# Patient Record
Sex: FEMALE | URBAN - METROPOLITAN AREA
[De-identification: names, ages, dates, MRNs, and addresses within clinical notes are randomized per-mention and may not be internally consistent; named-entity substitution may affect disease eponyms.]

---

## 2021-04-09 ENCOUNTER — IMMUNIZATION (OUTPATIENT)
Dept: PRIMARY CARE CLINIC | Age: 46
End: 2021-04-09
Payer: COMMERCIAL

## 2021-04-09 PROCEDURE — 91300 COVID-19, PFIZER VACCINE 30MCG/0.3ML DOSE: CPT | Performed by: INTERNAL MEDICINE

## 2021-04-09 PROCEDURE — 0001A COVID-19, PFIZER VACCINE 30MCG/0.3ML DOSE: CPT | Performed by: INTERNAL MEDICINE

## 2021-04-30 ENCOUNTER — IMMUNIZATION (OUTPATIENT)
Dept: PRIMARY CARE CLINIC | Age: 46
End: 2021-04-30
Payer: COMMERCIAL

## 2021-04-30 PROCEDURE — 91300 COVID-19, PFIZER VACCINE 30MCG/0.3ML DOSE: CPT | Performed by: INTERNAL MEDICINE

## 2021-04-30 PROCEDURE — 0002A COVID-19, PFIZER VACCINE 30MCG/0.3ML DOSE: CPT | Performed by: INTERNAL MEDICINE

## 2023-02-17 ENCOUNTER — OFFICE VISIT (OUTPATIENT)
Dept: GASTROENTEROLOGY | Age: 48
End: 2023-02-17
Payer: COMMERCIAL

## 2023-02-17 ENCOUNTER — HOSPITAL ENCOUNTER (OUTPATIENT)
Age: 48
Discharge: HOME | End: 2023-02-17
Payer: COMMERCIAL

## 2023-02-17 VITALS
HEIGHT: 66 IN | BODY MASS INDEX: 22.98 KG/M2 | SYSTOLIC BLOOD PRESSURE: 130 MMHG | DIASTOLIC BLOOD PRESSURE: 68 MMHG | WEIGHT: 143 LBS

## 2023-02-17 DIAGNOSIS — R10.13 DYSPEPSIA: Primary | ICD-10-CM

## 2023-02-17 DIAGNOSIS — R10.13 DYSPEPSIA: ICD-10-CM

## 2023-02-17 LAB
CRP SERPL HS-MCNC: <3 MG/L (ref 0–5)
ERYTHROCYTE [SEDIMENTATION RATE] IN BLOOD BY WESTERGREN METHOD: 6 MM/HR (ref 0–20)
FOLATE SERPL-MCNC: >20 NG/ML
GLIADIN IGA SER IA-ACNC: NORMAL U/ML
GLIADIN IGG SER IA-ACNC: NORMAL U/ML
IGA SERPL-MCNC: 244 MG/DL (ref 70–400)
TTG IGA SER IA-ACNC: NORMAL U/ML
VIT B12 SERPL-MCNC: >2000 PG/ML (ref 232–1245)

## 2023-02-17 PROCEDURE — 82746 ASSAY OF FOLIC ACID SERUM: CPT

## 2023-02-17 PROCEDURE — 82607 VITAMIN B-12: CPT

## 2023-02-17 PROCEDURE — 99203 OFFICE O/P NEW LOW 30 MIN: CPT | Performed by: INTERNAL MEDICINE

## 2023-02-17 PROCEDURE — 82784 ASSAY IGA/IGD/IGG/IGM EACH: CPT

## 2023-02-17 PROCEDURE — 83516 IMMUNOASSAY NONANTIBODY: CPT

## 2023-02-17 PROCEDURE — 85652 RBC SED RATE AUTOMATED: CPT

## 2023-02-17 PROCEDURE — 36415 COLL VENOUS BLD VENIPUNCTURE: CPT

## 2023-02-17 PROCEDURE — 86140 C-REACTIVE PROTEIN: CPT

## 2023-02-17 RX ORDER — UBIDECARENONE 75 MG
50 CAPSULE ORAL DAILY
COMMUNITY

## 2023-02-17 RX ORDER — FLUTICASONE PROPIONATE 50 MCG
SPRAY, SUSPENSION (ML) NASAL
COMMUNITY
Start: 2022-11-27

## 2023-02-17 RX ORDER — ALPRAZOLAM 0.25 MG/1
TABLET ORAL
COMMUNITY
Start: 2021-07-30

## 2023-02-17 RX ORDER — MULTIVIT-MIN/IRON/FOLIC ACID/K 18-600-40
CAPSULE ORAL
COMMUNITY

## 2023-02-17 RX ORDER — OMEPRAZOLE 40 MG/1
40 CAPSULE, DELAYED RELEASE ORAL DAILY
COMMUNITY
Start: 2022-11-27

## 2023-02-17 RX ORDER — OMEPRAZOLE 20 MG/1
20 CAPSULE, DELAYED RELEASE ORAL
Qty: 180 CAPSULE | Refills: 1 | Status: SHIPPED | OUTPATIENT
Start: 2023-02-17

## 2023-02-17 RX ORDER — SUCRALFATE 1 G/1
1 TABLET ORAL 4 TIMES DAILY
COMMUNITY
Start: 2023-01-08

## 2023-02-17 ASSESSMENT — ENCOUNTER SYMPTOMS
SORE THROAT: 0
DIARRHEA: 1
BLOOD IN STOOL: 0
ABDOMINAL PAIN: 1
ANAL BLEEDING: 0
NAUSEA: 1
TROUBLE SWALLOWING: 0
ABDOMINAL DISTENTION: 1
RECTAL PAIN: 1
COUGH: 0
CHOKING: 0
VOMITING: 0
SHORTNESS OF BREATH: 0
EYES NEGATIVE: 1
CONSTIPATION: 0
CHEST TIGHTNESS: 0

## 2023-02-17 NOTE — PROGRESS NOTES
Reason for Referral: Dyspepsia, changes in appetite, IBS-like foods. Early satiety. No referring provider defined for this encounter. Chief Complaint   Patient presents with    Irritable Bowel Syndrome           HISTORY OF PRESENT ILLNESS: Elliott Patterson is a 57 y.o. female with a past history remarkable for early satiety, irregular bowel habits and dyspepsia with abdominal pain localized to the epigastric region, subacute progression of the patient's symptoms since early December and months prior, referred for evaluation of the symptoms. Since the patient has been infected with COVID, the patient has been experiencing early morning nausea with dizziness and fatigue. He does report of postprandial satiety and reduction in the patient's appetite. Additionally, the patient was identified to have a family history significant for duodenal cancer, diagnosed at the age of 76. Patient had a recent colonoscopy in 2021 where the patient was identified to have a polyp, upper endoscopy performed at the same time which was negative. She presents now with persistent symptoms despite being on Carafate which has provided significant relief in her symptoms. Does not report any blood in her stools, no irregular bowel habits. Patient had performed food comprehensive panel outside which resulted in possible sensitivities to many food ingredients including wheat and almonds    Smoker: None   Drinking history: none   Illicit drugs:  None   Abdominal surgeries: None   Prior Colonoscopy: 2021-- Polyp,   Prior EGD: 2021--none   FH of GI issues: Mother- Duodenal Ca-- 76      Past Medical,Family, and Social History reviewed and does contribute to the patient presentingcondition. Patient's PMH/PSH,SH,PSYCH Hx, MEDs, ALLERGIES, and ROS were all reviewed and updated in the appropriate sections. PAST MEDICAL HISTORY:  No past medical history on file. No past surgical history on file.     CURRENT MEDICATIONS:    Current Outpatient Medications:     omeprazole (PRILOSEC) 20 MG delayed release capsule, Take 1 capsule by mouth 2 times daily (before meals), Disp: 180 capsule, Rfl: 1    sucralfate (CARAFATE) 1 GM tablet, 1 g 4 times daily (Patient not taking: Reported on 2/17/2023), Disp: , Rfl:     omeprazole (PRILOSEC) 40 MG delayed release capsule, 40 mg Daily (Patient not taking: Reported on 2/17/2023), Disp: , Rfl:     ALPRAZolam (XANAX) 0.25 MG tablet, , Disp: , Rfl:     fluticasone (FLONASE) 50 MCG/ACT nasal spray, , Disp: , Rfl:     Cholecalciferol (VITAMIN D) 50 MCG (2000 UT) CAPS capsule, Take by mouth, Disp: , Rfl:     vitamin B-12 (CYANOCOBALAMIN) 100 MCG tablet, Take 50 mcg by mouth daily, Disp: , Rfl:     ALLERGIES:   Allergies   Allergen Reactions    Cephalexin Diarrhea    Sertraline Anxiety, Dizziness or Vertigo and Palpitations       FAMILY HISTORY: No family history on file. SOCIAL HISTORY:   Social History     Socioeconomic History    Marital status:      Spouse name: Not on file    Number of children: Not on file    Years of education: Not on file    Highest education level: Not on file   Occupational History    Not on file   Tobacco Use    Smoking status: Not on file    Smokeless tobacco: Not on file   Substance and Sexual Activity    Alcohol use: Not on file    Drug use: Not on file    Sexual activity: Not on file   Other Topics Concern    Not on file   Social History Narrative    Not on file     Social Determinants of Health     Financial Resource Strain: Not on file   Food Insecurity: Not on file   Transportation Needs: Not on file   Physical Activity: Not on file   Stress: Not on file   Social Connections: Not on file   Intimate Partner Violence: Not on file   Housing Stability: Not on file         REVIEW OF SYSTEMS: A 12-point review of systems was obtained and pertinent positives and negatives were listed below.      REVIEW OF SYSTEMS:     Constitutional: No fever, no chills, no lethargy, no weakness. HEENT:  No headache, otalgia, itchy eyes, nasal discharge or sore throat. Cardiac:  No chest pain, dyspnea, orthopnea or PND. Chest:   No cough, phlegm or wheezing. Abdomen:      Detailed by MA   Neuro:  No focal weakness, abnormal movements or seizure like activity. Skin:   No rashes, no itching. :   No hematuria, no pyuria, no dysuria, no flank pain. Extremities:  No swelling or joint pains. ROS was otherwise negative    Review of Systems   Constitutional:  Positive for appetite change, fatigue and unexpected weight change. HENT:  Negative for sore throat and trouble swallowing. Eyes: Negative. Respiratory:  Negative for cough, choking, chest tightness and shortness of breath. Cardiovascular:  Negative for chest pain. Gastrointestinal:  Positive for abdominal distention, abdominal pain, diarrhea, nausea and rectal pain. Negative for anal bleeding, blood in stool, constipation and vomiting. Endocrine: Negative. Allergic/Immunologic: Positive for food allergies. PHYSICAL EXAMINATION: Vital signs reviewed per the nursing documentation. /68 (Site: Left Upper Arm, Position: Sitting, Cuff Size: Small Adult)   Ht 5' 6\" (1.676 m)   Wt 143 lb (64.9 kg)   BMI 23.08 kg/m²   Body mass index is 23.08 kg/m². Physical Exam    Physical Exam   Constitutional: Patient is oriented to person, place, and time. Patient appears well-developed and well-nourished. HENT:   Head: Normocephalic and atraumatic. Eyes: Pupils are equal, round, and reactive to light. EOM are normal.   Neck: Normal range of motion. Neck supple. No JVD present. No tracheal deviation present. No thyromegaly present. Cardiovascular: Normal rate, regular rhythm, normal heart sounds and intact distal pulses. Pulmonary/Chest: Effort normal and breath sounds normal. No stridor. No respiratory distress. He has no wheezes. He has no rales. He exhibits no tenderness. Abdominal: Soft.  Bowel sounds are normal. He exhibits no distension and no mass. There is no tenderness. There is no rebound and no guarding. No hernia. Musculoskeletal: Normal range of motion. Lymphadenopathy:    Patient has no cervical adenopathy. Neurological: Patient is alert and oriented to person, place, and time. Psychiatric: Patient has a normal mood and affect. Patient behavior is normal.       LABORATORY DATA: Reviewed  No results found for: WBC, HGB, HCT, MCV, PLT, NA, K, CL, CO2, BUN, CREATININE, LABPROT, LABALBU, GGT, BILITOT, ALKPHOS, AST, ALT, INR      No results found for: RBC, HGB, MCV, MCH, MCHC, RDW, MPV, BASOPCT, LYMPHSABS, MONOSABS, NEUTROABS, EOSABS, BASOSABS      DIAGNOSTIC TESTING:     No results found. IMPRESSION: Torey Coker is a 57 y.o. female with a past history remarkable for early satiety, irregular bowel habits and dyspepsia with abdominal pain localized to the epigastric region, subacute progression of the patient's symptoms since early December and months prior, referred for evaluation of the symptoms. Since the patient has been infected with COVID, the patient has been experiencing early morning nausea with dizziness and fatigue. He does report of postprandial satiety and reduction in the patient's appetite. Additionally, the patient was identified to have a family history significant for duodenal cancer, diagnosed at the age of 76. Patient had a recent colonoscopy in 2021 where the patient was identified to have a polyp, upper endoscopy performed at the same time which was negative. She presents now with persistent symptoms despite being on Carafate which has provided significant relief in her symptoms. Does not report any blood in her stools, no irregular bowel habits. Patient had performed food comprehensive panel outside which resulted in possible sensitivities to many food ingredients including wheat and almonds    Assessment  1.  Dyspepsia        Retia Guard was seen today for irritable bowel syndrome. Diagnoses and all orders for this visit:    Dyspepsia-early satiety with epigastric discomfort, I will send for serologic markers to evaluate for luminal causes. We will place patient on Prilosec 20 g twice daily. Patient advised to maintain a dietary log. Continue with Carafate as currently ordered. We will need to consider the possibility of IBS however other etiologies would need to be excluded. May need to consider upper endoscopy in the future. Monitor response to initial therapy. SIBO considered part of the differential.  -     Celiac Disease Panel; Future  -     Calprotectin Stool; Future  -     Sedimentation rate, automated; Future  -     C-Reactive Protein; Future  -     Vitamin B12 & Folate; Future    Other orders  -     omeprazole (PRILOSEC) 20 MG delayed release capsule; Take 1 capsule by mouth 2 times daily (before meals)         RTC: 3 months. Additional comments: Thank you for allowing me to participate in the care of Ms. Ojeda. For any further questions please do not hesitate to contact me. I have reviewed and agree with the MA/LPN ROS please refer to their documentation from today's encounter on a separate note. Humza Licona MD, MPH   Board Certified in Gastroenterology  Board Certified in [address] [place] [address] #: [phone]          this note is created with the assistance of a speech recognition program.  While intending to generate a document that actually reflects the content of the visit, the document can still have some errors including those of syntax and sound a like substitutions which may escape proof reading. It such instances, actual meaning can be extrapolated by contextual diversion.

## 2023-02-22 ENCOUNTER — HOSPITAL ENCOUNTER (OUTPATIENT)
Facility: CLINIC | Age: 48
Setting detail: SPECIMEN
Discharge: HOME | End: 2023-02-22
Payer: COMMERCIAL

## 2023-02-22 DIAGNOSIS — R10.13 DYSPEPSIA: ICD-10-CM

## 2023-02-22 PROCEDURE — 83993 ASSAY FOR CALPROTECTIN FECAL: CPT

## 2023-02-24 LAB — CALPROTECTIN, FECAL: 106 UG/G

## 2023-03-03 ENCOUNTER — TELEPHONE (OUTPATIENT)
Dept: GASTROENTEROLOGY | Age: 48
End: 2023-03-03

## 2023-03-07 DIAGNOSIS — R19.7 DIARRHEA, UNSPECIFIED TYPE: Primary | ICD-10-CM

## 2023-03-22 ENCOUNTER — HOSPITAL ENCOUNTER (OUTPATIENT)
Facility: CLINIC | Age: 48
Setting detail: SPECIMEN
Discharge: HOME | End: 2023-03-22
Payer: COMMERCIAL

## 2023-03-22 DIAGNOSIS — R19.7 DIARRHEA, UNSPECIFIED TYPE: ICD-10-CM

## 2023-03-22 PROCEDURE — 87209 SMEAR COMPLEX STAIN: CPT

## 2023-03-22 PROCEDURE — 87015 SPECIMEN INFECT AGNT CONCNTJ: CPT

## 2023-03-22 PROCEDURE — 87210 SMEAR WET MOUNT SALINE/INK: CPT

## 2023-03-22 PROCEDURE — 83993 ASSAY FOR CALPROTECTIN FECAL: CPT

## 2023-03-23 LAB
O+P STL CONC: NORMAL
SPECIMEN DESCRIPTION: NORMAL

## 2023-03-26 LAB — CALPROTECTIN, FECAL: 112 UG/G

## 2023-04-20 ENCOUNTER — TELEPHONE (OUTPATIENT)
Dept: GASTROENTEROLOGY | Age: 48
End: 2023-04-20

## 2023-04-20 RX ORDER — BISACODYL 5 MG/1
TABLET, DELAYED RELEASE ORAL
Qty: 4 TABLET | Refills: 0 | Status: SHIPPED | OUTPATIENT
Start: 2023-04-20

## 2023-04-20 RX ORDER — POLYETHYLENE GLYCOL 3350 17 G/17G
POWDER, FOR SOLUTION ORAL
Qty: 238 G | Refills: 0 | Status: SHIPPED | OUTPATIENT
Start: 2023-04-20

## 2023-04-20 NOTE — TELEPHONE ENCOUNTER
:EGD/colonoscopy/Mp Wheat    PBKULDIP 6/14/23 @  9:30am    Verbal instructions given @ visit  Written mailed    Miralax/dulcolax

## 2023-06-14 ENCOUNTER — HOSPITAL ENCOUNTER (OUTPATIENT)
Age: 48
Setting detail: OUTPATIENT SURGERY
Discharge: HOME | End: 2023-06-14
Attending: INTERNAL MEDICINE | Admitting: INTERNAL MEDICINE
Payer: COMMERCIAL

## 2023-06-14 VITALS
TEMPERATURE: 96.5 F | DIASTOLIC BLOOD PRESSURE: 43 MMHG | BODY MASS INDEX: 18.83 KG/M2 | WEIGHT: 120 LBS | OXYGEN SATURATION: 100 % | SYSTOLIC BLOOD PRESSURE: 112 MMHG | RESPIRATION RATE: 12 BRPM | HEIGHT: 67 IN | HEART RATE: 52 BPM

## 2023-06-14 DIAGNOSIS — Z12.11 SCREEN FOR COLON CANCER: ICD-10-CM

## 2023-06-14 DIAGNOSIS — R13.19 ESOPHAGEAL DYSPHAGIA: ICD-10-CM

## 2023-06-14 PROBLEM — R10.13 DYSPEPSIA: Status: ACTIVE | Noted: 2023-06-14

## 2023-06-14 LAB — HCG, PREGNANCY URINE (POC): NEGATIVE

## 2023-06-14 PROCEDURE — 3700000001 HC ADD 15 MINUTES (ANESTHESIA): Performed by: INTERNAL MEDICINE

## 2023-06-14 PROCEDURE — 2580000003 HC RX 258: Performed by: ANESTHESIOLOGY

## 2023-06-14 PROCEDURE — 3609012400 HC EGD TRANSORAL BIOPSY SINGLE/MULTIPLE: Performed by: INTERNAL MEDICINE

## 2023-06-14 PROCEDURE — 45380 COLONOSCOPY AND BIOPSY: CPT | Performed by: INTERNAL MEDICINE

## 2023-06-14 PROCEDURE — 3609010300 HC COLONOSCOPY W/BIOPSY SINGLE/MULTIPLE: Performed by: INTERNAL MEDICINE

## 2023-06-14 PROCEDURE — 2709999900 HC NON-CHARGEABLE SUPPLY: Performed by: INTERNAL MEDICINE

## 2023-06-14 PROCEDURE — 88305 TISSUE EXAM BY PATHOLOGIST: CPT

## 2023-06-14 PROCEDURE — 43239 EGD BIOPSY SINGLE/MULTIPLE: CPT | Performed by: INTERNAL MEDICINE

## 2023-06-14 PROCEDURE — 81025 URINE PREGNANCY TEST: CPT

## 2023-06-14 PROCEDURE — 7100000010 HC PHASE II RECOVERY - FIRST 15 MIN: Performed by: INTERNAL MEDICINE

## 2023-06-14 PROCEDURE — 7100000011 HC PHASE II RECOVERY - ADDTL 15 MIN: Performed by: INTERNAL MEDICINE

## 2023-06-14 PROCEDURE — 3700000000 HC ANESTHESIA ATTENDED CARE: Performed by: INTERNAL MEDICINE

## 2023-06-14 RX ORDER — SODIUM CHLORIDE 0.9 % (FLUSH) 0.9 %
5-40 SYRINGE (ML) INJECTION PRN
Status: DISCONTINUED | OUTPATIENT
Start: 2023-06-14 | End: 2023-06-14 | Stop reason: HOSPADM

## 2023-06-14 RX ORDER — SODIUM CHLORIDE 9 MG/ML
INJECTION, SOLUTION INTRAVENOUS PRN
Status: CANCELLED | OUTPATIENT
Start: 2023-06-14

## 2023-06-14 RX ORDER — MORPHINE SULFATE 2 MG/ML
1 INJECTION, SOLUTION INTRAMUSCULAR; INTRAVENOUS EVERY 5 MIN PRN
Status: CANCELLED | OUTPATIENT
Start: 2023-06-14

## 2023-06-14 RX ORDER — OXYCODONE HYDROCHLORIDE 5 MG/1
10 TABLET ORAL PRN
Status: CANCELLED | OUTPATIENT
Start: 2023-06-14 | End: 2023-06-14

## 2023-06-14 RX ORDER — ONDANSETRON 2 MG/ML
4 INJECTION INTRAMUSCULAR; INTRAVENOUS
Status: CANCELLED | OUTPATIENT
Start: 2023-06-14 | End: 2023-06-15

## 2023-06-14 RX ORDER — METOCLOPRAMIDE HYDROCHLORIDE 5 MG/ML
10 INJECTION INTRAMUSCULAR; INTRAVENOUS
Status: CANCELLED | OUTPATIENT
Start: 2023-06-14 | End: 2023-06-15

## 2023-06-14 RX ORDER — SODIUM CHLORIDE 0.9 % (FLUSH) 0.9 %
5-40 SYRINGE (ML) INJECTION EVERY 12 HOURS SCHEDULED
Status: DISCONTINUED | OUTPATIENT
Start: 2023-06-14 | End: 2023-06-14 | Stop reason: HOSPADM

## 2023-06-14 RX ORDER — SODIUM CHLORIDE 0.9 % (FLUSH) 0.9 %
5-40 SYRINGE (ML) INJECTION EVERY 12 HOURS SCHEDULED
Status: CANCELLED | OUTPATIENT
Start: 2023-06-14

## 2023-06-14 RX ORDER — HYDRALAZINE HYDROCHLORIDE 20 MG/ML
10 INJECTION INTRAMUSCULAR; INTRAVENOUS
Status: CANCELLED | OUTPATIENT
Start: 2023-06-14

## 2023-06-14 RX ORDER — SODIUM CHLORIDE 0.9 % (FLUSH) 0.9 %
5-40 SYRINGE (ML) INJECTION PRN
Status: CANCELLED | OUTPATIENT
Start: 2023-06-14

## 2023-06-14 RX ORDER — MEPERIDINE HYDROCHLORIDE 50 MG/ML
12.5 INJECTION INTRAMUSCULAR; INTRAVENOUS; SUBCUTANEOUS ONCE
Status: CANCELLED | OUTPATIENT
Start: 2023-06-14 | End: 2023-06-14

## 2023-06-14 RX ORDER — SODIUM CHLORIDE, SODIUM LACTATE, POTASSIUM CHLORIDE, CALCIUM CHLORIDE 600; 310; 30; 20 MG/100ML; MG/100ML; MG/100ML; MG/100ML
INJECTION, SOLUTION INTRAVENOUS CONTINUOUS
Status: DISCONTINUED | OUTPATIENT
Start: 2023-06-14 | End: 2023-06-14 | Stop reason: HOSPADM

## 2023-06-14 RX ORDER — OXYCODONE HYDROCHLORIDE 5 MG/1
5 TABLET ORAL PRN
Status: CANCELLED | OUTPATIENT
Start: 2023-06-14 | End: 2023-06-14

## 2023-06-14 RX ORDER — DIPHENHYDRAMINE HYDROCHLORIDE 50 MG/ML
12.5 INJECTION INTRAMUSCULAR; INTRAVENOUS
Status: CANCELLED | OUTPATIENT
Start: 2023-06-14 | End: 2023-06-15

## 2023-06-14 RX ORDER — SODIUM CHLORIDE 9 MG/ML
INJECTION, SOLUTION INTRAVENOUS PRN
Status: DISCONTINUED | OUTPATIENT
Start: 2023-06-14 | End: 2023-06-14 | Stop reason: HOSPADM

## 2023-06-14 RX ADMIN — SODIUM CHLORIDE, POTASSIUM CHLORIDE, SODIUM LACTATE AND CALCIUM CHLORIDE: 600; 310; 30; 20 INJECTION, SOLUTION INTRAVENOUS at 08:41

## 2023-06-14 ASSESSMENT — PAIN - FUNCTIONAL ASSESSMENT: PAIN_FUNCTIONAL_ASSESSMENT: 0-10

## 2023-06-14 NOTE — OP NOTE
limited to: medication allergy, medication reaction, cardiovascular and respiratory problems, bleeding, perforation, infection, and/or missed diagnosis. Following arrival in the endoscopy room, the patient was placed in the left lateral decubitus position and final time-out accomplished in the presence of the nursing staff. Baseline vital signs were obtained and reviewed, and IV sedation was subsequently initiated. FINDINGS: Rectal examination demonstrated no significant visible external abnormality and digital palpation was unremarkable. Following adequate conscious sedation the colonoscope was introduced and advanced under direct visualization to the cecum, which was identified by the ileocecal valve and appendiceal orifice. The bowel preparation was felt to be FAIR. This included moderate amounts of green stool that was mostly able to be adequately irrigated and aspirated. Cecal intubation time was 8 minutes. Once maximally inserted, the endoscope was withdrawn and the mucosa was carefully inspected. The mucosal exam revealed diverticulosis. Retroflexion was performed in the rectum and hemorrhoids. Withdrawal time was 14 minutes. IMPRESSION:     FAIR PREP     1-small mouth diverticula identified in the left colon, no large lesions or polyps identified  2-moderate external hemorrhoids, small internal hemorrhoids. RECOMMENDATIONS:   1) Follow up with referring provider, as previously scheduled. 2) Repeat colonoscopy in 5 yrs (+ FH of CRC, parent). 3) Follow up path in GI clinic. [address] [facility]  Gastroenterology   06/14/23    This note is created with the assistance of a speech recognition program.  While intending to generate a document that actually reflects the content of the visit, the document can still have some errors including those of syntax and sound a like substitutions which may escape proof reading.   It such instances, actual meaning can be extrapolated by

## 2023-06-14 NOTE — H&P
Procedure History and Physical    Pre-Procedural Diagnosis:  Dyspepsia, earlier satiety, irregular bowel movements    Indications:  same    Procedure Planned: endoscopy and colonoscopy     History Obtained From:  patient    HISTORY OF PRESENT ILLNESS:       The patient is a 57 y.o. female who presents for the above procedure. Past Medical History:    Past Medical History:   Diagnosis Date    GERD (gastroesophageal reflux disease)        Past Surgical History:    History reviewed. No pertinent surgical history. Medications:  Current Facility-Administered Medications   Medication Dose Route Frequency Provider Last Rate Last Admin    lactated ringers IV soln infusion   IntraVENous Continuous Brenda Nina MD        sodium chloride flush 0.9 % injection 5-40 mL  5-40 mL IntraVENous 2 times per day Brenda Nina MD        sodium chloride flush 0.9 % injection 5-40 mL  5-40 mL IntraVENous PRN Brenda Nina MD        0.9 % sodium chloride infusion   IntraVENous PRN Brenda Nina MD           Allergies: Allergies   Allergen Reactions    Cephalexin Diarrhea    Sertraline Anxiety, Dizziness or Vertigo and Palpitations                 Social   Social History     Tobacco Use    Smoking status: Never    Smokeless tobacco: Not on file   Substance Use Topics    Alcohol use: Not Currently        PSYCH HISTORY:  Depression No  Anxiety No  Suicide No       History reviewed. No pertinent family history. No family history of colon cancer, Crohn's disease, or ulcerative colitis    Problems with Sedation/Anesthesia in the past? no    REVIEW OF SYSTEMS:  12 point review of systems negative other than mentioned above.       PHYSICAL EXAM:    Vitals:  /88   Pulse 72   Temp (!) 96.4 °F (35.8 °C) (Infrared)   Resp 16   Ht 5' 6.5\" (1.689 m)   Wt 120 lb (54.4 kg)   SpO2 99%   BMI 19.08 kg/m²     Focused Exam related to procedure:    General appearance: NAD, conversant   Eyes: anicteric sclerae, moist conjunctivae; no lid-lag;

## 2023-06-15 LAB — SURGICAL PATHOLOGY REPORT: NORMAL

## 2023-07-14 PROBLEM — Z12.11 SCREEN FOR COLON CANCER: Status: RESOLVED | Noted: 2023-06-14 | Resolved: 2023-07-14

## 2023-08-02 ENCOUNTER — OFFICE VISIT (OUTPATIENT)
Dept: GASTROENTEROLOGY | Age: 48
End: 2023-08-02
Payer: COMMERCIAL

## 2023-08-02 VITALS
BODY MASS INDEX: 18.96 KG/M2 | HEIGHT: 67 IN | DIASTOLIC BLOOD PRESSURE: 57 MMHG | SYSTOLIC BLOOD PRESSURE: 96 MMHG | WEIGHT: 120.8 LBS

## 2023-08-02 DIAGNOSIS — R19.7 DIARRHEA, UNSPECIFIED TYPE: Primary | ICD-10-CM

## 2023-08-02 PROCEDURE — 99213 OFFICE O/P EST LOW 20 MIN: CPT | Performed by: INTERNAL MEDICINE

## 2023-08-02 ASSESSMENT — ENCOUNTER SYMPTOMS
EYES NEGATIVE: 1
CONSTIPATION: 0
SHORTNESS OF BREATH: 0
ANAL BLEEDING: 0
ABDOMINAL PAIN: 1
DIARRHEA: 1
COUGH: 0
BLOOD IN STOOL: 0
VOMITING: 0
CHEST TIGHTNESS: 0
SORE THROAT: 0
RECTAL PAIN: 1
ABDOMINAL DISTENTION: 1
CHOKING: 0
RESPIRATORY NEGATIVE: 1
NAUSEA: 1
TROUBLE SWALLOWING: 0

## 2023-08-02 NOTE — PROGRESS NOTES
Board Certified in Gastroenterology  Board Certified in [address] [place] [address] #: [phone]          this note is created with the assistance of a speech recognition program.  While intending to generate a document that actually reflects the content of the visit, the document can still have some errors including those of syntax and sound a like substitutions which may escape proof reading. It such instances, actual meaning can be extrapolated by contextual diversion.

## 2023-08-11 ENCOUNTER — HOSPITAL ENCOUNTER (OUTPATIENT)
Facility: CLINIC | Age: 48
Discharge: HOME | End: 2023-08-11
Payer: COMMERCIAL

## 2023-08-11 LAB
CHOLEST SERPL-MCNC: 180 MG/DL
CHOLESTEROL/HDL RATIO: 3.2
HDLC SERPL-MCNC: 56 MG/DL
LDLC SERPL CALC-MCNC: 114 MG/DL (ref 0–130)
TRIGL SERPL-MCNC: 48 MG/DL

## 2023-08-11 PROCEDURE — 36415 COLL VENOUS BLD VENIPUNCTURE: CPT

## 2023-08-11 PROCEDURE — 80061 LIPID PANEL: CPT

## 2023-09-05 ENCOUNTER — HOSPITAL ENCOUNTER (OUTPATIENT)
Facility: CLINIC | Age: 48
Setting detail: SPECIMEN
Discharge: HOME | End: 2023-09-05
Payer: COMMERCIAL

## 2023-09-05 DIAGNOSIS — R19.7 DIARRHEA, UNSPECIFIED TYPE: ICD-10-CM

## 2023-09-05 PROCEDURE — 83993 ASSAY FOR CALPROTECTIN FECAL: CPT

## 2023-09-07 LAB — CALPROTECTIN, FECAL: <5 UG/G

## 2024-11-22 ENCOUNTER — HOSPITAL ENCOUNTER (OUTPATIENT)
Facility: CLINIC | Age: 49
Discharge: HOME | End: 2024-11-22
Payer: COMMERCIAL

## 2024-11-22 LAB
ALBUMIN SERPL-MCNC: 4.4 G/DL (ref 3.5–5.2)
ALBUMIN/GLOB SERPL: 1.7 {RATIO} (ref 1–2.5)
ALP SERPL-CCNC: 43 U/L (ref 35–104)
ALT SERPL-CCNC: 13 U/L (ref 10–35)
ANION GAP SERPL CALCULATED.3IONS-SCNC: 10 MMOL/L (ref 9–16)
AST SERPL-CCNC: 19 U/L (ref 10–35)
BASOPHILS # BLD: 0.04 K/UL (ref 0–0.2)
BASOPHILS NFR BLD: 1 % (ref 0–2)
BILIRUB SERPL-MCNC: 0.5 MG/DL (ref 0–1.2)
BUN SERPL-MCNC: 16 MG/DL (ref 6–20)
CALCIUM SERPL-MCNC: 9.3 MG/DL (ref 8.6–10.4)
CHLORIDE SERPL-SCNC: 102 MMOL/L (ref 98–107)
CHOLEST SERPL-MCNC: 191 MG/DL (ref 0–199)
CHOLESTEROL/HDL RATIO: 2.7
CO2 SERPL-SCNC: 26 MMOL/L (ref 20–31)
CREAT SERPL-MCNC: 0.8 MG/DL (ref 0.6–0.9)
EOSINOPHIL # BLD: 0.05 K/UL (ref 0–0.44)
EOSINOPHILS RELATIVE PERCENT: 1 % (ref 1–4)
ERYTHROCYTE [DISTWIDTH] IN BLOOD BY AUTOMATED COUNT: 12.5 % (ref 11.8–14.4)
GFR, ESTIMATED: >90 ML/MIN/1.73M2
GLUCOSE SERPL-MCNC: 89 MG/DL (ref 74–99)
HCT VFR BLD AUTO: 38.5 % (ref 36.3–47.1)
HDLC SERPL-MCNC: 70 MG/DL
HGB BLD-MCNC: 12.9 G/DL (ref 11.9–15.1)
IMM GRANULOCYTES # BLD AUTO: <0.03 K/UL (ref 0–0.3)
IMM GRANULOCYTES NFR BLD: 0 %
LDLC SERPL CALC-MCNC: 109 MG/DL (ref 0–100)
LYMPHOCYTES NFR BLD: 1.38 K/UL (ref 1.1–3.7)
LYMPHOCYTES RELATIVE PERCENT: 20 % (ref 24–43)
MCH RBC QN AUTO: 31.9 PG (ref 25.2–33.5)
MCHC RBC AUTO-ENTMCNC: 33.5 G/DL (ref 28.4–34.8)
MCV RBC AUTO: 95.1 FL (ref 82.6–102.9)
MONOCYTES NFR BLD: 0.5 K/UL (ref 0.1–1.2)
MONOCYTES NFR BLD: 7 % (ref 3–12)
NEUTROPHILS NFR BLD: 71 % (ref 36–65)
NEUTS SEG NFR BLD: 4.78 K/UL (ref 1.5–8.1)
NRBC BLD-RTO: 0 PER 100 WBC
PLATELET # BLD AUTO: 239 K/UL (ref 138–453)
PMV BLD AUTO: 10 FL (ref 8.1–13.5)
POTASSIUM SERPL-SCNC: 4 MMOL/L (ref 3.7–5.3)
PROT SERPL-MCNC: 7 G/DL (ref 6.6–8.7)
RBC # BLD AUTO: 4.05 M/UL (ref 3.95–5.11)
SODIUM SERPL-SCNC: 138 MMOL/L (ref 136–145)
TRIGL SERPL-MCNC: 58 MG/DL (ref 0–149)
TSH SERPL DL<=0.05 MIU/L-ACNC: 1.61 UIU/ML (ref 0.27–4.2)
VLDLC SERPL CALC-MCNC: 12 MG/DL (ref 1–30)
WBC OTHER # BLD: 6.8 K/UL (ref 3.5–11.3)

## 2024-11-22 PROCEDURE — 85025 COMPLETE CBC W/AUTO DIFF WBC: CPT

## 2024-11-22 PROCEDURE — 84443 ASSAY THYROID STIM HORMONE: CPT

## 2024-11-22 PROCEDURE — 80061 LIPID PANEL: CPT

## 2024-11-22 PROCEDURE — 80053 COMPREHEN METABOLIC PANEL: CPT

## 2024-11-22 PROCEDURE — 36415 COLL VENOUS BLD VENIPUNCTURE: CPT

## 2025-02-18 ENCOUNTER — OFFICE VISIT (OUTPATIENT)
Age: 50
End: 2025-02-18
Payer: COMMERCIAL

## 2025-02-18 DIAGNOSIS — R10.2 PELVIC AND PERINEAL PAIN: Primary | ICD-10-CM

## 2025-02-18 DIAGNOSIS — M62.838 MUSCLE SPASM: ICD-10-CM

## 2025-02-18 PROCEDURE — 97161 PT EVAL LOW COMPLEX 20 MIN: CPT | Performed by: PHYSICAL THERAPIST

## 2025-02-18 PROCEDURE — 97530 THERAPEUTIC ACTIVITIES: CPT | Performed by: PHYSICAL THERAPIST

## 2025-02-18 NOTE — PROGRESS NOTES
[facility] UROGYNECOLOGY AND PELVIC REHABILITATION   [address] [facility]  [address] [place] [address]     Physical Therapy Pelvic Floor Evaluation    Date:  2025  Patient: Sheeba Ojeda  : 1975  MRN: [id number]  Referring Provider:   Dr Bertha Bender   Insurance: [place] [employer]/  Diagnosis Codes:   Visit Diagnoses         Codes    Pelvic and perineal pain    -  Primary R10.2    Muscle spasm     M62.838              Visit #   Day 1 of 2 day eval    Onset Date: Less than a year and has become more frequent   Next 's appt: 2025    Subjective:   CC/HPI:Pt is a 50 y.o. yo female who presents with complaints of pelvic perineal pain  only with orgasm and more recently with her most recent period, has been feeling clitoral pain vaginally     Up until a year ago, pt was on an IUD (Mirena) and had it removed  as her period started to come back .  Pt reports it  has taken a year to gauge predictability of  her period.  Every period has been regular until this past one.      Whirlwind of health  issues over the past couple of years  Started having stomach problems with heartburn and stomach pain and was placed on Imeprazole and started to have some dizzy spells.   Had trouble eating related to stomach issues.  Pt went to see functional medicine practitioner  who got pt off medications and more on natural supplement.  Has started to feel better and is getting stronger.    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     Other: Pt will get pulsing vibration in feet and legs and some shooting pain in adductors with some yoga movements.    Has Migraine with auras and reports this is a contraindication for HRT  Rectal itching surrounding

## 2025-02-18 NOTE — PATIENT INSTRUCTIONS
Revaree - hyaluronic acid    Vulva Balm where you need it to see if it helps with itching and by clitoral region.    Void every 2-3 hours

## 2025-02-25 ENCOUNTER — EVALUATION (OUTPATIENT)
Age: 50
End: 2025-02-25

## 2025-02-25 DIAGNOSIS — R10.2 PELVIC AND PERINEAL PAIN: ICD-10-CM

## 2025-02-25 DIAGNOSIS — M62.838 MUSCLE SPASM: Primary | ICD-10-CM

## 2025-02-25 NOTE — PATIENT INSTRUCTIONS
Goal is to void every 2 hours  5 kegels in a row to quiet the urge to try to delay to the 2 hour marsha    Use the vulvar balm 3-4x/ week    Kegels in sitting    Inhale to  prepare  Exhale as you \" the blueberry\" to do the kegel for 5 seconds  Relax for 10 seconds  5 repetitions  2x/day

## 2025-02-25 NOTE — PROGRESS NOTES
[facility] UROGYNECOLOGY AND PELVIC REHABILITATION   [address] [facility]  [address] [place] [address]     Physical Therapy Pelvic Floor Evaluation    Date:  2025  Patient: Sheeba Ojeda  : 1975  MRN: [id number]  Referring Provider:   Dr Bertha Bender   Insurance: [facility]/  Diagnosis Codes:   Visit Diagnoses         Codes    Muscle spasm    -  Primary M62.838    Pelvic and perineal pain     R10.2              Visit #   Day 2 of 2 day eval    Onset Date: Less than a year and has become more frequent   Next 's appt: 2025    Subjective:   CC/HPI:Pt is a 50 y.o. yo female who presents with complaints of pelvic perineal pain  only with orgasm and more recently with her most recent period, has been feeling clitoral pain vaginally     Up until a year ago, pt was on an IUD (Mirena) and had it removed  as her period started to come back .  Pt reports it  has taken a year to gauge predictability of  her period.  Every period has been regular until this past one.      Whirlwind of health  issues over the past couple of years  Started having stomach problems with heartburn and stomach pain and was placed on Imeprazole and started to have some dizzy spells.   Had trouble eating related to stomach issues.  Pt went to see functional medicine practitioner  who got pt off medications and more on natural supplement.  Has started to feel better and is getting stronger.    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     Other: Pt will get pulsing vibration in feet and legs and some shooting pain in adductors with some yoga movements.    Has Migraine with auras and reports this is a contraindication for HRT  Rectal itching surrounding

## 2025-03-04 ENCOUNTER — EVALUATION (OUTPATIENT)
Age: 50
End: 2025-03-04
Payer: COMMERCIAL

## 2025-03-04 DIAGNOSIS — M62.838 MUSCLE SPASM: ICD-10-CM

## 2025-03-04 DIAGNOSIS — R10.2 PELVIC AND PERINEAL PAIN: Primary | ICD-10-CM

## 2025-03-04 PROCEDURE — 97140 MANUAL THERAPY 1/> REGIONS: CPT | Performed by: PHYSICAL THERAPIST

## 2025-03-04 PROCEDURE — 97530 THERAPEUTIC ACTIVITIES: CPT | Performed by: PHYSICAL THERAPIST

## 2025-03-04 NOTE — PROGRESS NOTES
manual stimulation with her  with no pain over this past week demonstrating some improvement.       Pt will benefit from continued skilled PT intervention to progress towards goals.      Functional Goals Progress    Pt will present with no fascial restrictions surrounding clitoris and no mm tension anterior pfm allowing for low to no pain with orgasm.  Time frame: 8 weeks.   3/4/25:Goal partially met   Pt will be independent with urge suppression techniques allowing for improved void frequency to every 2-3 hours and decreased post void dribble by > 90% . Time frame: 6 weeks.   3/4/25:Goal partially met   Pt will demonstrate good pfm endurance and coordination allowing for decreased pelvic ache during her period.  Time frame: 12 weeks.   3/4/25:Goal not yet assessed   Pt will be independent with pelvic brace during times of increased IAP allowing for good support of pelvic organs Time frame: 8 weeks   3/4/25:Goal not yet assessed        Pt. Education:  [x] Yes  [] No  [x] Reviewed Prior HEP/Ed  Method of Education: [x] Verbal  [] Demo  [x] Written  Comprehension of Education:  [x] Verbalizes understanding.  [] Demonstrates understanding.  [x] Needs review.  [] Demonstrates/verbalizes HEP/Ed previously given.     Plan: [x] Continue current frequency toward long and short term goals.    Pt's goal: :  Decrease pain with orgasm and be healthy and strong.      Ask if pt has tried Hyaluronic Acid (pt wanted to try this prior to discussing vaginal estrogen with her doctor)  Ask about success with vulvar balm 3-4 times per week     Ask about success with urge suppression to normalize void frequency to every 2-3  hours     Myofascial release superior to the clitoral chew to improve fascial mobility  Periurethral myofascial release intravaginally     Biofeedback in sitting and standing for urge suppression  Biofeedback in sittin/10 /7        Time In:11:43             Time Out: 12:08    Electronically signed by:

## 2025-03-04 NOTE — PATIENT INSTRUCTIONS
Perimenopause resources    Fabiola Montano ([facility]) - podcast    Mi Echeverria (podcasts and books and videos)

## 2025-03-10 NOTE — PROGRESS NOTES
[facility] UROGYNECOLOGY AND PELVIC REHABILITATION   [address] [place] [address]  [address] [place] [address]  Dept: [phone]     Date of Visit: 3/10/2025   Patient: Sheeba Ojeda   : 1975   Referring Physician:  Dr Bertha Bender    Insurance: [facility]    Visit#:  4     Visit Diagnoses:     Visit Diagnoses         Codes      Pelvic and perineal pain    -  Primary R10.2      Muscle spasm     M62.838                Subjective:  Sheeba Ojeda  (: 1975  is a 50 y.o.  y.o. female ,.Pt states she is able to go 2-3 hours now before voiding and not getting up at night if she isnt drinking fluids before bed.  Occassionlly has the urgency when going 2-3 hours but able to make it to the bathroom and no leakage.  Pt states she is going to get the revaree and try it as the balm is not doing as good as she would like.        Objective:   Tightness across bilateral upper and lower abdomen    Pt deferred internal today just wasn't feeling right    Precautions/Contraindications for Vacuum Cupping (marsha all that apply):     [] Any shunts or stents  [] Any current hernias or history of hernia repair/surgery?  [] History of Cancer? If so what type?  []  Ant lymphnodes removes if so where and how many  []  Do you have a pacemaker  [] Any autoimmune disorders like scleroderma or lupus?  [] Have you had any vaccine or booster in the past week? Flu? Covid?  [] Have you tested positive for COVID in the past 30 days?  [] Are you currently or could you possibly be pregnant?  [] Any current UTIs, yeast infections and/or colds?  [x] None of the above apply and patient is cleared for vacuum cupping.     Treatment:  Manual Therapy:  30 MIN , MFR/Cupping to bilateral upper and lower abdomen  There-Act:  23 MIN  Reviewed POC and HEP.  Pt aware of after care and possible side effects of Cupping     Aftercare instructions:  any of these symptoms may last for 24-48

## 2025-03-11 ENCOUNTER — EVALUATION (OUTPATIENT)
Age: 50
End: 2025-03-11

## 2025-03-11 DIAGNOSIS — R10.2 PELVIC AND PERINEAL PAIN: Primary | ICD-10-CM

## 2025-03-11 DIAGNOSIS — M62.838 MUSCLE SPASM: ICD-10-CM

## 2025-03-18 ENCOUNTER — EVALUATION (OUTPATIENT)
Age: 50
End: 2025-03-18

## 2025-03-18 DIAGNOSIS — R10.2 PELVIC AND PERINEAL PAIN: ICD-10-CM

## 2025-03-18 DIAGNOSIS — M62.838 MUSCLE SPASM: Primary | ICD-10-CM

## 2025-03-18 NOTE — PATIENT INSTRUCTIONS
In sitting or standing    Inhale to  prepare  Exhale as you  kegel for 10  seconds  Relax for 10 seconds  10  repetitions  2x/day     Begin using hyaluronic acid over the next couple of weeks to see how this affects your symptoms. (Follow instructions on the box)

## 2025-03-18 NOTE — PROGRESS NOTES
[facility] UROGYNECOLOGY AND PELVIC REHABILITATION   [address] [place] [address]  Dept: [phone]     Date of Visit: 3/18/2025   Patient: Sheeba Ojeda   : 1975   Referring Physician:  Dr Bertha Bender    Insurance: [facility]    Visit#:      Visit Diagnoses:   Visit Diagnoses         Codes      Muscle spasm    -  Primary M62.838      Pelvic and perineal pain     R10.2            Subjective:  Pt reports that she started on her period and unsure if last treatment has affected her symptoms.  Pt had some inflammation for a few days after last session.. Irritation at urethral opening. When a gas bubble goes through the area, she will have transient pain.   Pt reports with tampon use (only uses when she goes to weddings or event)  - this slowed bleeding down and had less ache. (Pt reports that ache starts a few days in after period starts.)     Objective:       BFB  in sitting 7/10/7:  9.9/ 4.1  uV work/rest avg with good awareness, good endurance and good breath pattern with min v.c (good coordination with relaxation)     BFB  in standin/10/7:  8.4/ 3.2  uV work/rest avg with good awareness, good endurance and good breath pattern with min v.c (good coordination with relaxation)    Pt deferred internal today related to being on period.   _____________________  HEP  In sitting or standing    Inhale to  prepare  Exhale as you  kegel for 10  seconds  Relax for 10 seconds  10  repetitions  2x/day     Begin using hyaluronic acid over the next couple of weeks to see how this affects your symptoms. (Follow instructions on the box)  ___________________________    Assessment: Pt demonstrated good improvements in pelvic health musculature endurance during today's session. Pt completed BFB in sitting and standing during session. Pt displays good endurance with 7/10/7 in both positions with good ability to relax. Used verbal

## 2025-03-26 ENCOUNTER — EVALUATION (OUTPATIENT)
Age: 50
End: 2025-03-26

## 2025-03-26 DIAGNOSIS — R10.2 PELVIC AND PERINEAL PAIN: ICD-10-CM

## 2025-03-26 DIAGNOSIS — M62.838 MUSCLE SPASM: Primary | ICD-10-CM

## 2025-03-26 NOTE — PROGRESS NOTES
[facility], [facility] UROGYNECOLOGY AND PELVIC REHABILITATION  [address]  [address]  [facility] [address]  Dept: [phone]     Date of Visit: 3/26/2025   Patient: Sheeba Ojeda   : 1975   Referring Physician:  Dr Betrha Bender    Insurance: [facility]    Visit#:      Visit Diagnoses:     Subjective:    Pt reports that she is still uncomfortable with intercourse with clitoral pain lasting for a day afterward.  ( No direct stimulation )  Slight pain with arousal and pain with orgasm.  No pain in between.     Has Revaree and needs to time as unable to use with condoms.    Pt is ovulating and can feel discomfort  that is similar to UTI symptoms.      Objective:       Mod restriction indirect superior pull  at mons pubis  Mod tension intravaginal mariana-urethral mm.       _____________________  HEP  Revive Bladder Support on [employer] ($35)  Try when ovulatiing next time (to see affect on bladder pressure)    \"Neighborhood \" stretching - pull towards belly button to get a stretch at clitoral chew region  Once you feel less of  a stretch - you can do a direct pressure where you feel tension and give a gentle stretch  ___________________________    Assessment: Pt continues with clitoral discomfort after intercourse and twinge of clitoral pain with arousal and will benefit from self MFR in region of clitoral chew to determine affect on these symptoms.  Good release of tension after indirect superior traction at mons pubis.  Good release of intravaginal tension.  Good understanding of use of Revive during ovulation to determine affect on subtle bladder symptoms at this time in her cycle.  Pt will benefit from continued skilled PT intervention to progress towards goals.         Functional Goals Progress    Pt will present with no fascial restrictions surrounding clitoris and no mm tension anterior pfm allowing for low to no pain with orgasm.  Time frame: 8

## 2025-04-01 ENCOUNTER — EVALUATION (OUTPATIENT)
Age: 50
End: 2025-04-01
Payer: COMMERCIAL

## 2025-04-01 DIAGNOSIS — M62.838 MUSCLE SPASM: Primary | ICD-10-CM

## 2025-04-01 DIAGNOSIS — R10.2 PELVIC AND PERINEAL PAIN: ICD-10-CM

## 2025-04-01 PROCEDURE — 97140 MANUAL THERAPY 1/> REGIONS: CPT

## 2025-04-01 PROCEDURE — 97530 THERAPEUTIC ACTIVITIES: CPT

## 2025-04-01 NOTE — PROGRESS NOTES
[facility] PHYSICIANS [facility], [facility] UROGYNECOLOGY AND PELVIC REHABILITATION  [address]  [address]  [facility] [place] [address]  Dept: [phone]     Date of Visit: 2025   Patient: Sheeba Ojeda   : 1975   Referring Physician:  Dr Bertha Bender    Insurance: [facility]    Visit#:      Visit Diagnoses:     Visit Diagnoses         Codes      Muscle spasm    -  Primary M62.838      Pelvic and perineal pain     R10.2            Subjective:    Pt reports that she is still uncomfortable with intercourse with clitoral pain lasting for a day afterward.  ( No direct stimulation )  Slight pain with arousal and pain with orgasm.  No pain in between.     Has Revaree used it once and feels like it helped some but also had intermittent twinges when cleaning the house that time of using not sure if coincidence or what    Pt reports she has tried to massage mon pubis but doesn't work like when I am at PT with you guys.        Objective:       Mod restriction indirect superior pull  at mons pubis  Mod tension intravaginal mariana-urethral mm.     TREATMENT    THERE-ACT 15min reviewed stretches added pigeon pose, figure 8 stretch and standing adductor stretch also gave information on core balance with Bhargav Chahal    MANUAL THERAPY 45min  internal MFR, MFR to bilateral power abdomen mon pubis region     Assessment: Pt continues with clitoral discomfort after intercourse and twinge of clitoral pain with arousal and will benefit from self MFR in region of clitoral chew (reeducated on this today) to determine affect on these symptoms.  Good release of tension after indirect superior traction at mons pubis.  Good release of intravaginal tension.  Good understanding of use of Revive during ovulation to determine affect on subtle bladder symptoms at this time in her cycle.  Pt will benefit from continued skilled PT intervention to progress towards goals.         Functional Goals Progress

## 2025-04-08 ENCOUNTER — EVALUATION (OUTPATIENT)
Age: 50
End: 2025-04-08
Payer: COMMERCIAL

## 2025-04-08 DIAGNOSIS — M62.838 MUSCLE SPASM: Primary | ICD-10-CM

## 2025-04-08 DIAGNOSIS — R10.2 PELVIC AND PERINEAL PAIN: ICD-10-CM

## 2025-04-08 PROCEDURE — 97140 MANUAL THERAPY 1/> REGIONS: CPT | Performed by: PHYSICAL THERAPIST

## 2025-04-08 PROCEDURE — 97530 THERAPEUTIC ACTIVITIES: CPT | Performed by: PHYSICAL THERAPIST

## 2025-04-08 NOTE — PROGRESS NOTES
[facility] PHYSICIANS [facility], [facility] UROGYNECOLOGY AND PELVIC REHABILITATION  [address]  [address]  [facility] [place] [address]  Dept: [phone]     Date of Visit: 2025   Patient: Sheeba Ojeda   : 1975   Referring Physician:  Dr Bertha Bender    Insurance: [facility]    Visit#:      Visit Diagnoses:   Visit Diagnoses         Codes      Muscle spasm    -  Primary M62.838      Pelvic and perineal pain     R10.2          Visit Diagnoses         Codes      Muscle spasm    -  Primary M62.838      Pelvic and perineal pain     R10.2            Subjective:    Pt reports that after last visit with Peg, she was encouraged to  have intercourse and did not have pain, but orgasm was not as strong.  Pt had intercourse one more time to see if there was a change in symptoms, but not \"into it\" and noticed one poke of clitoral pain, but not a strong orgasm.  Pt was lying on stomach on soft mattress and noticed shooting pain in vaginal canal (transient).    Much less clitoral pain the day after intercourse demonstrating improvement.     Less clitoral pain this week.     Plans on using Revaree tonight in anticipation of tissue changes stating period tomorrow.    Objective:      Left L2 FRS    Mod tension lower abdomen superior to pubic symphysis  Mod tension anterior deep pfm.     TREATMENT    THERE-ACT 25min reviewed progress towards goals and POC and educated about YOGAJA fascial rolling class    MANUAL THERAPY 30 min  MET to lumbar spine; MFR to central lower abdomen and anterior deep fascial release with counterpressure on lower abdomen.      Assessment: Pt reports improvement of symptoms after deep tissue massage to lower abdomen and intravaginal MFR with no clitoral pain with intercourse, however less intense orgasms. Has not noticed clitoral awareness all week demonstrating improvement.  Good correction of lumbar mm imbalances to minimize transient vaginal pain (occurred

## 2025-04-15 ENCOUNTER — EVALUATION (OUTPATIENT)
Age: 50
End: 2025-04-15
Payer: COMMERCIAL

## 2025-04-15 DIAGNOSIS — R10.2 PELVIC AND PERINEAL PAIN: ICD-10-CM

## 2025-04-15 DIAGNOSIS — M62.838 MUSCLE SPASM: Primary | ICD-10-CM

## 2025-04-15 PROCEDURE — 97530 THERAPEUTIC ACTIVITIES: CPT | Performed by: PHYSICAL THERAPIST

## 2025-04-15 PROCEDURE — 97140 MANUAL THERAPY 1/> REGIONS: CPT | Performed by: PHYSICAL THERAPIST

## 2025-04-15 NOTE — PROGRESS NOTES
[facility] PHYSICIANS [facility], [facility] UROGYNECOLOGY AND PELVIC REHABILITATION  [address]  [address]  [facility] [place] [address]  Dept: [phone]     Date of Visit: 4/15/2025   Patient: Sheeba Ojeda   : 1975   Referring Physician:  Dr Bertha Bender    Insurance: [facility]    Visit#:      Visit Diagnoses:     Visit Diagnoses         Codes      Muscle spasm    -  Primary M62.838      Pelvic and perineal pain     R10.2                  Subjective:    Pt reports that She has not had intercourse due to being on her cycle this past week.  Pt feels treatment has been helping as in the past when she has had intercourse there has been less pain and \"OK\" orgasm however no pain with orgasm.  Pt is doing a program called [employer] as well as well as a mobility and strengthening program that she feels is helping as well      Objective:       Mod tension lower abdomen superior to pubic symphysis       TREATMENT    THERE-ACT 25min reviewed progress towards goals and POC  MANUAL THERAPY 30 min  MFR to central lower abdomen and anterior pubic region     Aftercare instructions:  any of these symptoms may last for 24-48 hours after treatment  No hot pack or cold pack for 6 hours anywhere on the body   If you are light headed and/or nauseas or get a headache. Drink more water or electrolyte solution (ex water diluted Gatorade, LMNT, liquid IV) as we drain everything into your lymphatic system which can caused increased frequency with urination or increased sweating. It is not uncommon to see people significantly increase fluid intake and we recommend meeting your bodies demands. This is a NORMAL response.   If you are achy and you can take tylenol, motrin or ibuprofen if needed we are unable to prescribed it but will not interfere with treatment.  For females: you may see some spotting especially if you are close or at the end of your cycle. This is normal. Drink more water.

## 2025-04-15 NOTE — PATIENT INSTRUCTIONS
Aftercare instructions:  any of these symptoms may last for 24-48 hours after treatment  No hot pack or cold pack for 6 hours anywhere on the body   If you are light headed and/or nauseas or get a headache. Drink more water or electrolyte solution (ex water diluted Gatorade, LMNT, liquid IV) as we drain everything into your lymphatic system which can caused increased frequency with urination or increased sweating. It is not uncommon to see people significantly increase fluid intake and we recommend meeting your bodies demands. This is a NORMAL response.   If you are achy and you can take tylenol, motrin or ibuprofen if needed we are unable to prescribed it but will not interfere with treatment.  For females: you may see some spotting especially if you are close or at the end of your cycle. This is normal. Drink more water.

## 2025-04-22 ENCOUNTER — EVALUATION (OUTPATIENT)
Age: 50
End: 2025-04-22
Payer: COMMERCIAL

## 2025-04-22 DIAGNOSIS — R10.2 PELVIC AND PERINEAL PAIN: ICD-10-CM

## 2025-04-22 DIAGNOSIS — M62.838 MUSCLE SPASM: Primary | ICD-10-CM

## 2025-04-22 PROCEDURE — 97530 THERAPEUTIC ACTIVITIES: CPT | Performed by: PHYSICAL THERAPIST

## 2025-04-22 PROCEDURE — 97140 MANUAL THERAPY 1/> REGIONS: CPT | Performed by: PHYSICAL THERAPIST

## 2025-04-22 NOTE — PROGRESS NOTES
moderate tightness and tenderness noted at bilateral lower back and buttocks region decreased after MFR/cupping.  Tightness noted at left 1-2nd layers on the left and  tenderness noted at bilateral OI however pt states neither of these areas hurt with intercourse.   Pt will benefit from continued skilled PT intervention to progress towards goals.         Functional Goals Progress    Pt will present with no fascial restrictions surrounding clitoris and no mm tension anterior pfm allowing for low to no pain with orgasm.  Time frame: 8 weeks.   4/22/25:Goal partially met   Pt will be independent with urge suppression techniques allowing for improved void frequency to every 2-3 hours and decreased post void dribble by > 90% . Time frame: 6 weeks.   3/18/25:Goal met   Pt will demonstrate good pfm endurance and coordination allowing for decreased pelvic ache during her period.  Time frame: 12 weeks.   4/22/25:Goal partially met     Pt will be independent with pelvic brace during times of increased IAP allowing for good support of pelvic organs Time frame: 8 weeks   4/22 /25:Goal partially met -         .              Plan:    [x] Continue current frequency toward long and short term goals.    Pt's goal: :  Decrease pain with orgasm and be healthy and strong.      Ask if pt has tried Hyaluronic Acid (pt wanted to try this prior to discussing vaginal estrogen with her doctor)(Pt plans on trying as of 3/18/25 and will try week of 3/26 did try this and feels it might have helped some    Ask about success with vulvar balm 3-4 times per week     Myofascial release superior to the clitoral chew to improve fascial mobility  Periurethral myofascial release intravaginally  Deep tissue manual therapy lower abdomen and fascial release intravaginally    Ask if pt looked into fascial release class at [facility]      Treatment Charges: Mins Units   []  Modalities     []  Ther Exercise given WI for updated HEP     [x]  Manual Therapy MET for

## 2025-05-06 ENCOUNTER — EVALUATION (OUTPATIENT)
Age: 50
End: 2025-05-06

## 2025-05-06 DIAGNOSIS — R10.2 PELVIC AND PERINEAL PAIN: ICD-10-CM

## 2025-05-06 DIAGNOSIS — M62.838 MUSCLE SPASM: Primary | ICD-10-CM

## 2025-05-06 NOTE — PROGRESS NOTES
[facility], [facility] UROGYNECOLOGY AND PELVIC REHABILITATION  [address]  [address]  [facility] [place] [address]  Dept: [phone]   DISCHARGE SUMMARY    Date of Visit: 2025   Patient: Sheeba Ojeda   : 1975   Referring Physician:  Dr Bertha Bender    Insurance: [employer]    Visit#:    Eval date: 25    Visit Diagnoses:   Visit Diagnoses         Codes      Muscle spasm    -  Primary M62.838      Pelvic and perineal pain     R10.2          Subjective:    Pt reports things are going really well.  3-4 episodes of non painful sex.  Had urethral pain (distal) all day last Wednesday.  Pt did stretches for pelvic region and drank lots of water and felt somewhat better, however noticed she was constipated.  When she was able to have a BM, she had felt much better at urethra.  Has had more BM per day and feels this is linked to her improvement.     Much less clitoral pain  Improved orgasms    Objective:     Min/Mod tension bilateral lower back, psis, piriformis, quadratus region and abdomen    No intravaginal assessment today as pt is on her cycle.       TREATMENT    THERE-ACT 25 min reviewed progress towards goals and POC  MANUAL THERAPY 30  min  MFR to abdomen, bilateral LB/buttocks      Assessment: Pt was seen for a total of 11 skilled PT visits from 25 - 25 to address post void dribble and improve sexual function as well as bladder and bowel function.  Pt has made good progress towards her goals.  Patient has a good understanding of the relationship between bladder bowel and pelvic/urethral discomfort.  Patient presents with decreased abdominal pelvic tension since starting physical therapy.  As patient has met most of her goals and is pleased with her progress, she is to be discharged at this time.  Should patient have an exacerbation of her symptoms, she will benefit from physical therapy reevaluation.        Functional Goals Progress

## (undated) DEVICE — PERRYSBURG ENDO PACK: Brand: MEDLINE INDUSTRIES, INC.

## (undated) DEVICE — Device: Brand: DEFENDO VALVE AND CONNECTOR KIT

## (undated) DEVICE — BITEBLOCK 54FR W/ DENT RIM BLOX

## (undated) DEVICE — ADAPTER CLEANING PORPOISE CLEANING

## (undated) DEVICE — FORCEPS BX L240CM JAW DIA2.8MM L CAP W/ NDL MIC MESH TOOTH